# Patient Record
Sex: MALE | Race: BLACK OR AFRICAN AMERICAN | NOT HISPANIC OR LATINO | URBAN - METROPOLITAN AREA
[De-identification: names, ages, dates, MRNs, and addresses within clinical notes are randomized per-mention and may not be internally consistent; named-entity substitution may affect disease eponyms.]

---

## 2024-08-27 ENCOUNTER — OFFICE VISIT (OUTPATIENT)
Dept: URGENT CARE | Facility: CLINIC | Age: 29
End: 2024-08-27
Payer: COMMERCIAL

## 2024-08-27 VITALS
HEIGHT: 74 IN | WEIGHT: 202.5 LBS | BODY MASS INDEX: 25.99 KG/M2 | DIASTOLIC BLOOD PRESSURE: 74 MMHG | SYSTOLIC BLOOD PRESSURE: 115 MMHG | OXYGEN SATURATION: 97 % | TEMPERATURE: 99 F | RESPIRATION RATE: 20 BRPM | HEART RATE: 71 BPM

## 2024-08-27 DIAGNOSIS — R11.0 NAUSEA: ICD-10-CM

## 2024-08-27 DIAGNOSIS — A08.4 VIRAL GASTROENTERITIS: Primary | ICD-10-CM

## 2024-08-27 DIAGNOSIS — R19.7 DIARRHEA, UNSPECIFIED TYPE: ICD-10-CM

## 2024-08-27 RX ORDER — ONDANSETRON 4 MG/1
4 TABLET, ORALLY DISINTEGRATING ORAL 2 TIMES DAILY
Qty: 6 TABLET | Refills: 0 | Status: SHIPPED | OUTPATIENT
Start: 2024-08-27 | End: 2024-08-30

## 2024-08-27 NOTE — PATIENT INSTRUCTIONS
Gastroenteritis  If your condition worsens or fails to improve we recommend that you receive another evaluation at the ER immediately or contact your PCP to discuss your concerns or return here. You must understand that you've received an urgent care treatment only and that you may be released before all your medical problems are known or treated. You the patient will arrange for followup care as instructed.   If you have diarrhea you can use Pepto Bismol or Imodium as directed  Increase fluids and rest is important   Start off with liquid diet and progress as tolerated (see below)  Water and clear liquids are important so you do not get dehydrated. Drink a small amount at a time.  Do not force yourself to eat, especially if you have cramps, vomiting, or diarrhea. When you finally decide to start eating, do not eat large amounts at a time, even if you are hungry.  If you eat, avoid fatty, greasy, spicy, or fried foods.  Do not eat dairy products if you have diarrhea; they can make the diarrhea worse  Watch for any increase pain, fever, localized pain to right lower abdomen or continued vomiting or diarrhea.

## 2024-08-27 NOTE — PROGRESS NOTES
"Subjective:      Patient ID: Carlos Escobar is a 29 y.o. male.    Vitals:  height is 6' 2" (1.88 m) and weight is 91.9 kg (202 lb 8 oz). His oral temperature is 98.8 °F (37.1 °C). His blood pressure is 115/74 and his pulse is 71. His respiration is 20 and oxygen saturation is 97%.     Chief Complaint: GI Problem    Pt states that he began to have some stomach pain Saturday night Sunday morning. Pt reports he has not vomited yet. Pt states that he has been taking Dali Francisco , Tums and Advil. Pt states that he has been having serious diarrhea. On Sunday Pt states that he has had about 7 episodes of diarrhea. Pt states that he has been drinking a lot of water and a rapid hydration Gatorade. Pt states that he has been able to eat.     29-year-old male presents to clinic with complaints of diarrhea, nausea, stomach cramping started 3 days ago treating with Dali-Francisco, tums Advil, Gatorade. Reports diarrhea x 7 episodes each day for the past 2 days. Denies recent travel or consumption of raw food. Meal prior to onset of symptoms was a frozen healthy choice consisting of pasta, red sauce and chicken    GI Problem  The primary symptoms include abdominal pain, nausea and diarrhea. Primary symptoms do not include fever, weight loss, fatigue, vomiting, melena, hematemesis, jaundice, hematochezia, dysuria, myalgias, arthralgias or rash. The illness began 3 to 5 days ago (Saturday night). The onset was gradual.   The illness is also significant for back pain (soarness). The illness does not include chills, anorexia, dysphagia, odynophagia, bloating, constipation, tenesmus or itching. Associated symptoms comments: Headaches   . Associated medical issues do not include inflammatory bowel disease, GERD, gallstones, liver disease, alcohol abuse, PUD, gastric bypass, bowel resection, irritable bowel syndrome, hemorrhoids or diverticulitis.       Constitution: Negative for chills, fatigue and fever.   Gastrointestinal:  " Positive for abdominal pain, nausea and diarrhea. Negative for vomiting, constipation and bright red blood in stool.   Genitourinary:  Negative for dysuria.   Musculoskeletal:  Positive for back pain (soarness). Negative for joint pain and muscle ache.   Skin:  Negative for rash.      Objective:     Physical Exam   Constitutional: He is oriented to person, place, and time. He appears well-developed.   HENT:   Head: Normocephalic and atraumatic.   Ears:   Right Ear: External ear normal.   Left Ear: External ear normal.   Nose: Nose normal.   Mouth/Throat: Mucous membranes are normal. Mucous membranes are moist.   Eyes: Lids are normal. Extraocular movement intact   Neck: Trachea normal. Neck supple.   Cardiovascular: Normal rate, regular rhythm and normal heart sounds.   Pulmonary/Chest: Effort normal and breath sounds normal. No respiratory distress.   Abdominal: Normal appearance and bowel sounds are normal. He exhibits no distension and no mass. Soft. There is no abdominal tenderness.   Musculoskeletal: Normal range of motion.         General: Normal range of motion.   Neurological: He is alert and oriented to person, place, and time. He has normal strength.   Skin: Skin is warm, dry, intact, not diaphoretic and not pale.   Psychiatric: His speech is normal and behavior is normal. Judgment and thought content normal.   Nursing note and vitals reviewed.      Assessment:     1. Viral gastroenteritis    2. Nausea    3. Diarrhea, unspecified type        Plan:       Viral gastroenteritis    Nausea  -     ondansetron (ZOFRAN-ODT) 4 MG TbDL; Take 1 tablet (4 mg total) by mouth 2 (two) times daily. for 3 days  Dispense: 6 tablet; Refill: 0    Diarrhea, unspecified type        Patient Instructions   Gastroenteritis  If your condition worsens or fails to improve we recommend that you receive another evaluation at the ER immediately or contact your PCP to discuss your concerns or return here. You must understand that you've  received an urgent care treatment only and that you may be released before all your medical problems are known or treated. You the patient will arrange for followup care as instructed.   If you have diarrhea you can use Pepto Bismol or Imodium as directed  Increase fluids and rest is important   Start off with liquid diet and progress as tolerated (see below)  Water and clear liquids are important so you do not get dehydrated. Drink a small amount at a time.  Do not force yourself to eat, especially if you have cramps, vomiting, or diarrhea. When you finally decide to start eating, do not eat large amounts at a time, even if you are hungry.  If you eat, avoid fatty, greasy, spicy, or fried foods.  Do not eat dairy products if you have diarrhea; they can make the diarrhea worse  Watch for any increase pain, fever, localized pain to right lower abdomen or continued vomiting or diarrhea.

## 2024-08-27 NOTE — LETTER
August 27, 2024      [facility] [place]  [facility] [place] [facility] [address]  Phone: [phone]  Fax: [phone]       Patient: Carlos Escobar   YOB: 1995  Date of Visit: 08/27/2024    To Whom It May Concern:    Cornel Escobar  was at [facility] on 08/27/2024. The patient may return to work/school on 08/28/2024 with no restrictions. If you have any questions or concerns, or if I can be of further assistance, please do not hesitate to contact me.    Sincerely,     Radha العراقي NP